# Patient Record
Sex: FEMALE | Race: BLACK OR AFRICAN AMERICAN | NOT HISPANIC OR LATINO | URBAN - METROPOLITAN AREA
[De-identification: names, ages, dates, MRNs, and addresses within clinical notes are randomized per-mention and may not be internally consistent; named-entity substitution may affect disease eponyms.]

---

## 2022-11-08 ENCOUNTER — EMERGENCY (EMERGENCY)
Facility: HOSPITAL | Age: 22
LOS: 1 days | Discharge: HOME | End: 2022-11-08
Attending: EMERGENCY MEDICINE | Admitting: EMERGENCY MEDICINE
Payer: COMMERCIAL

## 2022-11-08 VITALS
OXYGEN SATURATION: 99 % | SYSTOLIC BLOOD PRESSURE: 115 MMHG | TEMPERATURE: 98 F | RESPIRATION RATE: 16 BRPM | HEART RATE: 67 BPM | DIASTOLIC BLOOD PRESSURE: 67 MMHG

## 2022-11-08 VITALS
OXYGEN SATURATION: 100 % | WEIGHT: 161.38 LBS | HEART RATE: 78 BPM | TEMPERATURE: 98 F | RESPIRATION RATE: 16 BRPM | DIASTOLIC BLOOD PRESSURE: 75 MMHG | SYSTOLIC BLOOD PRESSURE: 121 MMHG

## 2022-11-08 DIAGNOSIS — Z87.39 PERSONAL HISTORY OF OTHER DISEASES OF THE MUSCULOSKELETAL SYSTEM AND CONNECTIVE TISSUE: ICD-10-CM

## 2022-11-08 DIAGNOSIS — Z20.822 CONTACT WITH AND (SUSPECTED) EXPOSURE TO COVID-19: ICD-10-CM

## 2022-11-08 DIAGNOSIS — R29.810 FACIAL WEAKNESS: ICD-10-CM

## 2022-11-08 DIAGNOSIS — Z87.19 PERSONAL HISTORY OF OTHER DISEASES OF THE DIGESTIVE SYSTEM: ICD-10-CM

## 2022-11-08 DIAGNOSIS — J00 ACUTE NASOPHARYNGITIS [COMMON COLD]: ICD-10-CM

## 2022-11-08 DIAGNOSIS — R20.0 ANESTHESIA OF SKIN: ICD-10-CM

## 2022-11-08 DIAGNOSIS — Z82.49 FAMILY HISTORY OF ISCHEMIC HEART DISEASE AND OTHER DISEASES OF THE CIRCULATORY SYSTEM: ICD-10-CM

## 2022-11-08 LAB
ALBUMIN SERPL ELPH-MCNC: 4.9 G/DL — SIGNIFICANT CHANGE UP (ref 3.3–5)
ALP SERPL-CCNC: 64 U/L — SIGNIFICANT CHANGE UP (ref 40–120)
ALT FLD-CCNC: 11 U/L — SIGNIFICANT CHANGE UP (ref 10–45)
ANION GAP SERPL CALC-SCNC: 10 MMOL/L — SIGNIFICANT CHANGE UP (ref 5–17)
AST SERPL-CCNC: 14 U/L — SIGNIFICANT CHANGE UP (ref 10–40)
BASOPHILS # BLD AUTO: 0.03 K/UL — SIGNIFICANT CHANGE UP (ref 0–0.2)
BASOPHILS NFR BLD AUTO: 0.3 % — SIGNIFICANT CHANGE UP (ref 0–2)
BILIRUB SERPL-MCNC: 0.5 MG/DL — SIGNIFICANT CHANGE UP (ref 0.2–1.2)
BUN SERPL-MCNC: 9 MG/DL — SIGNIFICANT CHANGE UP (ref 7–23)
CALCIUM SERPL-MCNC: 9.3 MG/DL — SIGNIFICANT CHANGE UP (ref 8.4–10.5)
CHLORIDE SERPL-SCNC: 100 MMOL/L — SIGNIFICANT CHANGE UP (ref 96–108)
CO2 SERPL-SCNC: 26 MMOL/L — SIGNIFICANT CHANGE UP (ref 22–31)
CREAT SERPL-MCNC: 0.98 MG/DL — SIGNIFICANT CHANGE UP (ref 0.5–1.3)
EGFR: 83 ML/MIN/1.73M2 — SIGNIFICANT CHANGE UP
EOSINOPHIL # BLD AUTO: 0.03 K/UL — SIGNIFICANT CHANGE UP (ref 0–0.5)
EOSINOPHIL NFR BLD AUTO: 0.3 % — SIGNIFICANT CHANGE UP (ref 0–6)
GLUCOSE SERPL-MCNC: 100 MG/DL — HIGH (ref 70–99)
HCG UR QL: NEGATIVE — SIGNIFICANT CHANGE UP
HCT VFR BLD CALC: 36.8 % — SIGNIFICANT CHANGE UP (ref 34.5–45)
HGB BLD-MCNC: 11.7 G/DL — SIGNIFICANT CHANGE UP (ref 11.5–15.5)
IMM GRANULOCYTES NFR BLD AUTO: 0.3 % — SIGNIFICANT CHANGE UP (ref 0–0.9)
LYMPHOCYTES # BLD AUTO: 2.34 K/UL — SIGNIFICANT CHANGE UP (ref 1–3.3)
LYMPHOCYTES # BLD AUTO: 24 % — SIGNIFICANT CHANGE UP (ref 13–44)
MCHC RBC-ENTMCNC: 27.9 PG — SIGNIFICANT CHANGE UP (ref 27–34)
MCHC RBC-ENTMCNC: 31.8 GM/DL — LOW (ref 32–36)
MCV RBC AUTO: 87.8 FL — SIGNIFICANT CHANGE UP (ref 80–100)
MONOCYTES # BLD AUTO: 0.5 K/UL — SIGNIFICANT CHANGE UP (ref 0–0.9)
MONOCYTES NFR BLD AUTO: 5.1 % — SIGNIFICANT CHANGE UP (ref 2–14)
NEUTROPHILS # BLD AUTO: 6.84 K/UL — SIGNIFICANT CHANGE UP (ref 1.8–7.4)
NEUTROPHILS NFR BLD AUTO: 70 % — SIGNIFICANT CHANGE UP (ref 43–77)
NRBC # BLD: 0 /100 WBCS — SIGNIFICANT CHANGE UP (ref 0–0)
PLATELET # BLD AUTO: 290 K/UL — SIGNIFICANT CHANGE UP (ref 150–400)
POTASSIUM SERPL-MCNC: 3.8 MMOL/L — SIGNIFICANT CHANGE UP (ref 3.5–5.3)
POTASSIUM SERPL-SCNC: 3.8 MMOL/L — SIGNIFICANT CHANGE UP (ref 3.5–5.3)
PROT SERPL-MCNC: 8.2 G/DL — SIGNIFICANT CHANGE UP (ref 6–8.3)
RBC # BLD: 4.19 M/UL — SIGNIFICANT CHANGE UP (ref 3.8–5.2)
RBC # FLD: 14.3 % — SIGNIFICANT CHANGE UP (ref 10.3–14.5)
SARS-COV-2 RNA SPEC QL NAA+PROBE: NEGATIVE — SIGNIFICANT CHANGE UP
SODIUM SERPL-SCNC: 136 MMOL/L — SIGNIFICANT CHANGE UP (ref 135–145)
WBC # BLD: 9.77 K/UL — SIGNIFICANT CHANGE UP (ref 3.8–10.5)
WBC # FLD AUTO: 9.77 K/UL — SIGNIFICANT CHANGE UP (ref 3.8–10.5)

## 2022-11-08 PROCEDURE — 85025 COMPLETE CBC W/AUTO DIFF WBC: CPT

## 2022-11-08 PROCEDURE — 70498 CT ANGIOGRAPHY NECK: CPT | Mod: MA

## 2022-11-08 PROCEDURE — 0042T: CPT

## 2022-11-08 PROCEDURE — 99285 EMERGENCY DEPT VISIT HI MDM: CPT

## 2022-11-08 PROCEDURE — 70498 CT ANGIOGRAPHY NECK: CPT | Mod: 26,MA

## 2022-11-08 PROCEDURE — 70551 MRI BRAIN STEM W/O DYE: CPT | Mod: 26,ME

## 2022-11-08 PROCEDURE — 82962 GLUCOSE BLOOD TEST: CPT

## 2022-11-08 PROCEDURE — 70496 CT ANGIOGRAPHY HEAD: CPT | Mod: MA

## 2022-11-08 PROCEDURE — 80053 COMPREHEN METABOLIC PANEL: CPT

## 2022-11-08 PROCEDURE — 70551 MRI BRAIN STEM W/O DYE: CPT | Mod: ME

## 2022-11-08 PROCEDURE — G1004: CPT

## 2022-11-08 PROCEDURE — 93005 ELECTROCARDIOGRAM TRACING: CPT

## 2022-11-08 PROCEDURE — 87635 SARS-COV-2 COVID-19 AMP PRB: CPT

## 2022-11-08 PROCEDURE — 36415 COLL VENOUS BLD VENIPUNCTURE: CPT

## 2022-11-08 PROCEDURE — 99285 EMERGENCY DEPT VISIT HI MDM: CPT | Mod: 25

## 2022-11-08 PROCEDURE — 70496 CT ANGIOGRAPHY HEAD: CPT | Mod: 26,MA

## 2022-11-08 PROCEDURE — 81025 URINE PREGNANCY TEST: CPT

## 2022-11-08 PROCEDURE — 70450 CT HEAD/BRAIN W/O DYE: CPT | Mod: MA

## 2022-11-08 RX ADMIN — Medication 60 MILLIGRAM(S): at 17:13

## 2022-11-08 NOTE — ED ADULT NURSE NOTE - OBJECTIVE STATEMENT
pt A&Ox4 am pt A&Ox4 ambulatory appears comfortable arrives via walk in triage for eval left sided facial droop, heaviness, and decreased sensation last normal 11/7/22 at 19:00 friend noticed droop and told pt to go look at it. Thought it would go away but did not. b/l equal hand grasps on exam 5/5 strength to upper and lower extremities. No head ache blurry vision slurred speech noted. Respirations unlabored no cp sob airway patent speaks clear full sentences. abd nondistended no n/v/d constipation fever chills. no le swelling. stroke code activated in triage. NIH scale 2. stroke states unlikely cva more likely bells palsy. CTs done pt pending mri short stroke protocol ED and stroke state ok to go off continuous cardiac monitor.

## 2022-11-08 NOTE — ED ADULT TRIAGE NOTE - CHIEF COMPLAINT QUOTE
Pt c/o facial droop and decreased sensation to left side of face since last night at 1900. Denies headache, dizziness, vision changes.

## 2022-11-08 NOTE — CONSULT NOTE ADULT - SUBJECTIVE AND OBJECTIVE BOX
**STROKE CODE CONSULT NOTE**    Last known well time/Time of onset of symptoms: 11/7/22 @ 1900    HPI: 22y Female with PMHx of acid reflux and ACL repair presents to the ED with left facial droop and decreased sensation of the left side of her face. Her facial droop was noted 11/7 at 1900 by the patient's friend and she feels as though it's gotten progressively worse since then prompting her visit to the ED this morning. She also has increasing loss in sensation of her face from her forehead to her jaw. She feels like she has some weakness to the left eyelid. The patient reports that she has been in her normal state except for a mild cold. She denies loss or change of taste, difficulty hearing, or headache.     T(C): 36.7 (11-08-22 @ 13:00), Max: 36.7 (11-08-22 @ 12:43)  HR: 72 (11-08-22 @ 14:00) (56 - 78)  BP: 118/68 (11-08-22 @ 14:00) (118/68 - 140/67)  RR: 16 (11-08-22 @ 14:00) (16 - 16)  SpO2: 100% (11-08-22 @ 14:00) (100% - 100%)    PAST MEDICAL & SURGICAL HISTORY:  ACL Repair  Acid Reflux    FAMILY HISTORY:      SOCIAL HISTORY:   Patient lives at home.   Smoking status: Occasional  Drinking: Denies  Drug Use: Marijuana use 3x/ week    ROS:   Constitutional: No fever, weight loss or fatigue  Eyes: No eye pain, visual disturbances, or discharge  ENMT:  No difficulty hearing, tinnitus; No sinus or throat pain  Neck: No pain or stiffness  Respiratory: No cough, wheezing, chills or hemoptysis. Endorsed rhinitis.   Cardiovascular: No chest pain, palpitations, shortness of breath, or leg swelling  Gastrointestinal: No abdominal pain. No nausea, vomiting or hematemesis; No diarrhea or constipation. Nohematochezia.  Genitourinary: No dysuria, frequency, hematuria or incontinence  Neurological: As per HPI  Skin: No itching, burning, rashes or lesions   Endocrine: No heat or cold intolerance; No hair loss  Musculoskeletal: No joint pain or swelling; No muscle, back or extremity pain  Heme/Lymph: No easy bruising or bleeding gums    MEDICATIONS  (STANDING):    MEDICATIONS  (PRN):    Allergies    No Known Allergies    Intolerances      Vital Signs Last 24 Hrs  T(C): 36.7 (08 Nov 2022 13:00), Max: 36.7 (08 Nov 2022 12:43)  T(F): 98 (08 Nov 2022 13:00), Max: 98 (08 Nov 2022 12:43)  HR: 72 (08 Nov 2022 14:00) (56 - 78)  BP: 118/68 (08 Nov 2022 14:00) (118/68 - 140/67)  BP(mean): --  RR: 16 (08 Nov 2022 14:00) (16 - 16)  SpO2: 100% (08 Nov 2022 14:00) (100% - 100%)    Parameters below as of 08 Nov 2022 14:00  Patient On (Oxygen Delivery Method): room air        Physical exam:  Constitutional: No acute distress, conversant  Eyes: Anicteric sclerae, moist conjunctivae, see below for CNs  Neck: trachea midline  Cardiovascular: Regular rate and rhythm on monitor   Pulmonary: No use of accessory muscles  GI: Abdomen soft, non-distended, non-tender  Extremities: Radial and DP pulses +2, no edema    Neurologic:  -Mental status: Awake, alert, oriented to person, place, and time. Speech is fluent with intact naming, repetition, and comprehension, no dysarthria. Recent and remote memory intact. Follows commands. Attention/concentration intact. Fund of knowledge appropriate.  -Cranial nerves:   II: Visual fields are full to confrontation.  III, IV, VI: Extraocular movements are intact without nystagmus. Pupils equally round and reactive to light  V:  Facial sensation V1-V3 decreased on left face, most densely in V3  VII: Face is asymmetric with left facial droop  Motor: Normal bulk and tone. No pronator drift. Strength bilateral upper extremity 5/5, bilateral lower extremities 5/5.  Rapid alternating movements intact and symmetric  Sensation: Intact to light touch bilaterally. No neglect or extinction on double simultaneous testing.  Coordination: No dysmetria on finger-to-nose bilaterally  Gait: Narrow gait and steady    NIHSS: 2     Fingerstick Blood Glucose: CAPILLARY BLOOD GLUCOSE  92 (08 Nov 2022 14:38)      POCT Blood Glucose.: 92 mg/dL (08 Nov 2022 12:43)    LABS:                        11.7   9.77  )-----------( 290      ( 08 Nov 2022 13:17 )             36.8     11-08    136  |  100  |  9   ----------------------------<  100<H>  3.8   |  26  |  0.98    Ca    9.3      08 Nov 2022 13:17    TPro  8.2  /  Alb  4.9  /  TBili  0.5  /  DBili  x   /  AST  14  /  ALT  11  /  AlkPhos  64  11-08              RADIOLOGY & ADDITIONAL STUDIES:    < from: CT Brain Stroke Protocol (11.08.22 @ 13:09) >  Impression:    No acute intracranial hemorrhage, mass effect or large demarcated   territorial infarction..    < end of copied text >    -----------------------------------------------------------------------------------------------------------------  IV-tPA (Y/N):    N                              Bolus time:    Alteplase Dose Verification w/ RN:  Reason IV-tPA not given: Patient out of window for TPA    **STROKE CODE CONSULT NOTE**    Last known well time/Time of onset of symptoms: 11/7/22 @ 1900    HPI: 22y Female with PMHx of acid reflux and ACL repair presents to the ED with left facial droop and decreased sensation of the left side of her face. Her facial droop was noted 11/7 at 1900 by the patient's friend and she feels as though it's gotten progressively worse since then prompting her visit to the ED this morning. She also has increasing loss in sensation of her face from her forehead to her jaw. She feels like she has some weakness to the left eyelid. The patient reports that she has been in her normal state of health except for a mild cold. She denies loss or change of taste,  headache, weakness in arms/legs, speech changes.     T(C): 36.7 (11-08-22 @ 13:00), Max: 36.7 (11-08-22 @ 12:43)  HR: 72 (11-08-22 @ 14:00) (56 - 78)  BP: 118/68 (11-08-22 @ 14:00) (118/68 - 140/67)  RR: 16 (11-08-22 @ 14:00) (16 - 16)  SpO2: 100% (11-08-22 @ 14:00) (100% - 100%)    PAST MEDICAL & SURGICAL HISTORY:  ACL Repair  Acid Reflux    FAMILY HISTORY:      SOCIAL HISTORY:   Patient lives at home.   Smoking status: Occasional  Drinking: Denies  Drug Use: Marijuana use 3x/ week    ROS:   Constitutional: No fever, weight loss or fatigue  Eyes: No eye pain, visual disturbances, or discharge  ENMT:  No difficulty hearing, tinnitus; No sinus or throat pain  Neck: No pain or stiffness  Respiratory: No cough, wheezing, chills or hemoptysis. Endorsed rhinitis.   Cardiovascular: No chest pain, palpitations, shortness of breath, or leg swelling  Gastrointestinal: No abdominal pain. No nausea, vomiting or hematemesis; No diarrhea or constipation. Nohematochezia.  Genitourinary: No dysuria, frequency, hematuria or incontinence  Neurological: As per HPI  Skin: No itching, burning, rashes or lesions   Endocrine: No heat or cold intolerance; No hair loss  Musculoskeletal: No joint pain or swelling; No muscle, back or extremity pain  Heme/Lymph: No easy bruising or bleeding gums    MEDICATIONS  (STANDING):    MEDICATIONS  (PRN):    Allergies    No Known Allergies    Intolerances      Vital Signs Last 24 Hrs  T(C): 36.7 (08 Nov 2022 13:00), Max: 36.7 (08 Nov 2022 12:43)  T(F): 98 (08 Nov 2022 13:00), Max: 98 (08 Nov 2022 12:43)  HR: 72 (08 Nov 2022 14:00) (56 - 78)  BP: 118/68 (08 Nov 2022 14:00) (118/68 - 140/67)  BP(mean): --  RR: 16 (08 Nov 2022 14:00) (16 - 16)  SpO2: 100% (08 Nov 2022 14:00) (100% - 100%)    Parameters below as of 08 Nov 2022 14:00  Patient On (Oxygen Delivery Method): room air        Physical exam:  Constitutional: No acute distress, conversant  Eyes: Anicteric sclerae, moist conjunctivae, see below for CNs  Neck: trachea midline  Cardiovascular: Regular rate and rhythm on monitor   Pulmonary: No use of accessory muscles  GI: Abdomen soft, non-distended, non-tender  Extremities: Radial and DP pulses +2, no edema    Neurologic:  -Mental status: Awake, alert, oriented to person, place, and time. Speech is fluent with intact naming, repetition, and comprehension, no dysarthria. Recent and remote memory intact. Follows commands. Attention/concentration intact. Fund of knowledge appropriate.  -Cranial nerves:   II: Visual fields are full to confrontation.  III, IV, VI: Extraocular movements are intact without nystagmus. Pupils equally round and reactive to light  V:  Facial sensation V1-V3 decreased on left face, most densely in V3  VII: Face is asymmetric with left facial droop  Motor: Normal bulk and tone. No pronator drift. Strength bilateral upper extremity 5/5, bilateral lower extremities 5/5.  Rapid alternating movements intact and symmetric  Sensation: Intact to light touch bilaterally. No neglect or extinction on double simultaneous testing.  Coordination: No dysmetria on finger-to-nose bilaterally  Gait: Narrow gait and steady    NIHSS: 2     Fingerstick Blood Glucose: CAPILLARY BLOOD GLUCOSE  92 (08 Nov 2022 14:38)      POCT Blood Glucose.: 92 mg/dL (08 Nov 2022 12:43)    LABS:                        11.7   9.77  )-----------( 290      ( 08 Nov 2022 13:17 )             36.8     11-08    136  |  100  |  9   ----------------------------<  100<H>  3.8   |  26  |  0.98    Ca    9.3      08 Nov 2022 13:17    TPro  8.2  /  Alb  4.9  /  TBili  0.5  /  DBili  x   /  AST  14  /  ALT  11  /  AlkPhos  64  11-08              RADIOLOGY & ADDITIONAL STUDIES:    < from: CT Brain Stroke Protocol (11.08.22 @ 13:09) >  Impression:    No acute intracranial hemorrhage, mass effect or large demarcated   territorial infarction..    < end of copied text >    -----------------------------------------------------------------------------------------------------------------  IV-tPA (Y/N):    N                              Bolus time:    Alteplase Dose Verification w/ RN:  Reason IV-tPA not given: Patient out of window for TPA    **STROKE CODE CONSULT NOTE**    Last known well time/Time of onset of symptoms: 11/7/22 @ 1900    HPI: 22y Female with PMHx of acid reflux and ACL repair presents to the ED with left facial droop and decreased sensation of the left side of her face. Her facial droop was noted 11/7 at 1900 by the patient's friend and she feels as though it's gotten progressively worse since then prompting her visit to the ED this morning. She also has increasing loss in sensation of her face from her forehead to her jaw. She feels like she has some weakness to the left eyelid. Initially on exam the patient has a left facial droop, throughout interview with patient she intermittently smiles symmetrically. The patient reports that she has been in her normal state of health except for a mild cold. She denies loss or change of taste,  headache, weakness in arms/legs, speech changes.     T(C): 36.7 (11-08-22 @ 13:00), Max: 36.7 (11-08-22 @ 12:43)  HR: 72 (11-08-22 @ 14:00) (56 - 78)  BP: 118/68 (11-08-22 @ 14:00) (118/68 - 140/67)  RR: 16 (11-08-22 @ 14:00) (16 - 16)  SpO2: 100% (11-08-22 @ 14:00) (100% - 100%)    PAST MEDICAL & SURGICAL HISTORY:  ACL Repair  Acid Reflux    FAMILY HISTORY:      SOCIAL HISTORY:   Patient lives at home.   Smoking status: Occasional  Drinking: Denies  Drug Use: Marijuana use 3x/ week    ROS:   Constitutional: No fever, weight loss or fatigue  Eyes: No eye pain, visual disturbances, or discharge  ENMT:  No difficulty hearing, tinnitus; No sinus or throat pain  Neck: No pain or stiffness  Respiratory: No cough, wheezing, chills or hemoptysis. Endorsed rhinitis.   Cardiovascular: No chest pain, palpitations, shortness of breath, or leg swelling  Gastrointestinal: No abdominal pain. No nausea, vomiting or hematemesis; No diarrhea or constipation. Nohematochezia.  Genitourinary: No dysuria, frequency, hematuria or incontinence  Neurological: As per HPI  Skin: No itching, burning, rashes or lesions   Endocrine: No heat or cold intolerance; No hair loss  Musculoskeletal: No joint pain or swelling; No muscle, back or extremity pain  Heme/Lymph: No easy bruising or bleeding gums    MEDICATIONS  (STANDING):    MEDICATIONS  (PRN):    Allergies    No Known Allergies    Intolerances      Vital Signs Last 24 Hrs  T(C): 36.7 (08 Nov 2022 13:00), Max: 36.7 (08 Nov 2022 12:43)  T(F): 98 (08 Nov 2022 13:00), Max: 98 (08 Nov 2022 12:43)  HR: 72 (08 Nov 2022 14:00) (56 - 78)  BP: 118/68 (08 Nov 2022 14:00) (118/68 - 140/67)  BP(mean): --  RR: 16 (08 Nov 2022 14:00) (16 - 16)  SpO2: 100% (08 Nov 2022 14:00) (100% - 100%)    Parameters below as of 08 Nov 2022 14:00  Patient On (Oxygen Delivery Method): room air        Physical exam:  Constitutional: No acute distress, conversant  Eyes: Anicteric sclerae, moist conjunctivae, see below for CNs  Neck: trachea midline  Cardiovascular: Regular rate and rhythm on monitor   Pulmonary: No use of accessory muscles  GI: Abdomen soft, non-distended, non-tender  Extremities: Radial and DP pulses +2, no edema    Neurologic:  -Mental status: Awake, alert, oriented to person, place, and time. Speech is fluent with intact naming, repetition, and comprehension, no dysarthria. Recent and remote memory intact. Follows commands. Attention/concentration intact. Fund of knowledge appropriate.  -Cranial nerves:   II: Visual fields are full to confrontation.  III, IV, VI: Extraocular movements are intact without nystagmus. Pupils equally round and reactive to light  V:  Facial sensation V1-V3 decreased on left face, most densely in V3  VII: Face is asymmetric with left facial droop. Consistent when repeatedly asked to smile. Patient smiles and laughs throughout conversation and interview, appears symmetric. Able to raise both eyebrows although forehead on left appears slightly less furrowed. Slightly easier to open left eyelid when closing eyes tightly.  Motor: Normal bulk and tone. No pronator drift. Strength bilateral upper extremity 5/5, bilateral lower extremities 5/5.  Rapid alternating movements intact and symmetric  Sensation: Intact to light touch bilaterally. No neglect or extinction on double simultaneous testing.  Coordination: No dysmetria on finger-to-nose bilaterally  Gait: Narrow gait and steady    NIHSS: 2     Fingerstick Blood Glucose: CAPILLARY BLOOD GLUCOSE  92 (08 Nov 2022 14:38)      POCT Blood Glucose.: 92 mg/dL (08 Nov 2022 12:43)    LABS:                        11.7   9.77  )-----------( 290      ( 08 Nov 2022 13:17 )             36.8     11-08    136  |  100  |  9   ----------------------------<  100<H>  3.8   |  26  |  0.98    Ca    9.3      08 Nov 2022 13:17    TPro  8.2  /  Alb  4.9  /  TBili  0.5  /  DBili  x   /  AST  14  /  ALT  11  /  AlkPhos  64  11-08              RADIOLOGY & ADDITIONAL STUDIES:    < from: CT Brain Stroke Protocol (11.08.22 @ 13:09) >  Impression:    No acute intracranial hemorrhage, mass effect or large demarcated   territorial infarction..    < end of copied text >    -----------------------------------------------------------------------------------------------------------------  IV-tPA (Y/N):    N                              Bolus time:    Alteplase Dose Verification w/ RN:  Reason IV-tPA not given: Patient out of window for TPA

## 2022-11-08 NOTE — CONSULT NOTE ADULT - ASSESSMENT
This is a 22 year old female with past medical history of acid reflux and ACL repair presents to the ED with a left facial droop and decreased sensation to the left face, most densely in the V3 distribution, and subjective weakness in the left eyelid. Symptoms were noted by a friend at 1900 on 11/7 and worsened today prompting this ED visit. Stroke code was called in the ED patient had a CTH that was negative. CTA and perfusion negative for perfusion deficit or LVO. Patient out of the window for TPA. Short stroke protocol MRI completed while patient was in the ED negative for infarct however due to patient's young age with symptoms involving both motor and sensation concern for peripheral nerve involvement (ie entrapment). Will follow up for repeat assessment with attending.     Discussed case with Dr. Downs   This is a 22 year old female with past medical history of acid reflux and ACL repair presents to the ED with a left facial droop and decreased sensation to the left face, most densely in the V3 distribution, and subjective weakness in the left eyelid. Symptoms were noted by a friend at 1900 on 11/7 and worsened today prompting this ED visit. Stroke code was called in the ED patient had a CTH that was negative. CTA and perfusion negative for perfusion deficit or LVO. Patient out of the window for TPA. Short stroke protocol MRI completed while patient was in the ED negative for infarct however due to patient's young age with symptoms involving both motor and sensation concern for peripheral nerve involvement. On further assessment patient with possible Bell's Palsy with some possible functional overlay. Patient discussed with Dr. Downs and plan for dispo per the ED. Consider treating for Bell's Palsy and following up with general neurology if symptoms are persistent or worsen for further workup.     Discussed case with Dr. Downs   This is a 22 year old female with past medical history of acid reflux and ACL repair presents to the ED with a left facial droop and decreased sensation to the left face, most densely in the V3 distribution, and subjective weakness in the left eyelid. Symptoms were noted by a friend at 1900 on 11/7 and worsened today prompting this ED visit. Stroke code was called in the ED patient had a CTH that was negative. CTA and perfusion negative for perfusion deficit or LVO. Patient out of the window for TPA. Short stroke protocol MRI completed while patient was in the ED negative for infarct however due to patient's young age with symptoms involving both motor and sensation concern for peripheral nerve involvement.   On further assessment patient with possible Bell's Palsy with some possible functional overlay (d/t symmetric smile throughout conversation). Patient discussed with Dr. Downs and plan for dispo per the ED.     -Consider treating for Bell's Palsy with steroids and following up with general neurology if symptoms are persistent or worsen for further workup.     Discussed case with Dr. Downs

## 2022-11-08 NOTE — ED ADULT NURSE REASSESSMENT NOTE - NS ED NURSE REASSESS COMMENT FT1
per stroke pt to get mri short stroke protocol MD Zhang and Stroke state pt may go off monitor to mri

## 2022-11-08 NOTE — ED PROVIDER NOTE - CLINICAL SUMMARY MEDICAL DECISION MAKING FREE TEXT BOX
L sided facial droop- seen by stroke team in CT- request- CT/CTa's- will follow L sided facial droop- seen by stroke team in CT- request- CT/CTa's- will follow - ct/mri all neg- d/w attg Dr Downs- not a stroke- probable [place]- steroids rx- si/sx to return d/w px

## 2022-11-08 NOTE — CONSULT NOTE ADULT - NS ATTEND AMEND GEN_ALL_CORE FT
Suspect symptoms of facial weakness could be from early Bell's Palsy but this would not explain subjective numbness to left face/arm. There appears to be a potential element of functional overlay. Regardless, rec tx for Bell's Palsy. MRI negative for acute stroke. Patient can f/u outpatient but should return if symptoms worse for reevaluation/consideration of repeat MRI w and w/o contrast

## 2022-11-08 NOTE — ED PROVIDER NOTE - NSFOLLOWUPINSTRUCTIONS_ED_ALL_ED_FT
Mclaughlin's Palsy, Adult       Bell's palsy is a short-term inability to move muscles in a part of the face. The inability to move, also called paralysis, results from inflammation or compression of the seventh cranial nerve. This nerve travels along the skull and under the ear to the side of the face. This nerve is responsible for facial movements that include blinking, closing the eyes, smiling, and frowning.      What are the causes?    The exact cause of this condition is not known. It may be caused by an infection from a virus, such as the chickenpox (herpes zoster), Elvi–Barr, or mumps virus.      What increases the risk?    You are more likely to develop this condition if:  •You are pregnant.      •You have diabetes.      •You have had a recent infection in your nose, throat, or airways.      •You have a weakened body defense system (immune system).      •You have had a facial injury, such as a fracture.      •You have a family history of Bell's palsy.        What are the signs or symptoms?    Symptoms of this condition include:  •Weakness on one side of the face.      •Drooping eyelid and corner of the mouth.      •Excessive tearing in one eye.      •Difficulty closing the eyelid.      •Dry eye.      •Drooling.      •Dry mouth.      •Changes in taste.      •Change in facial appearance.      •Pain behind one ear.      •Ringing in one or both ears.      •Sensitivity to sound in one ear.      •Facial twitching.      •Headache.      •Impaired speech.      •Dizziness.      •Difficulty eating or drinking.      Most of the time, only one side of the face is affected. In rare cases, Bell's palsy may affect the whole face.      How is this diagnosed?    This condition is diagnosed based on:  •Your symptoms.      •Your medical history.      •A physical exam.      You may also have to see health care providers who specialize in disorders of the nerves (neurologist) or diseases and conditions of the eye (ophthalmologist). You may have tests, such as:  •A test to check for nerve damage (electromyogram).      •Imaging studies, such as a CT scan or an MRI.      •Blood tests.        How is this treated?    This condition affects every person differently. Sometimes symptoms go away without treatment within a couple weeks. If treatment is needed, it varies from person to person. The goal of treatment is to reduce inflammation and protect the eye from damage. Treatment for Bell's palsy may include:•Medicines, such as:  •Steroids to reduce swelling and inflammation.      •Antiviral medicines.      •Pain relievers, including aspirin, acetaminophen, or ibuprofen.        •Eye drops or ointment to keep your eye moist.      •Eye protection, if you cannot close your eye.      •Exercises or massage to regain muscle strength and function (physical therapy).        Follow these instructions at home:     •Take over-the-counter and prescription medicines only as told by your health care provider.    •If your eye is affected:  •Keep your eye moist with eye drops or ointment as told by your health care provider.      •Follow instructions for eye care and protection as told by your health care provider.        •Do any physical therapy exercises as told by your health care provider.      •Keep all follow-up visits. This is important.        Contact a health care provider if:    •You have a fever or chills.      •Your symptoms do not get better within 2–3 weeks, or your symptoms get worse.      •Your eye is red, irritated, or painful.      •You have new symptoms.        Get help right away if:    •You have weakness or numbness in a part of your body other than your face.      •You have trouble swallowing.      •You develop neck pain or stiffness.      •You develop dizziness or shortness of breath.        Summary    •Bell's palsy is a short-term inability to move muscles in a part of the face. The inability to move results from inflammation or compression of the facial nerve.      •This condition affects every person differently. Sometimes symptoms go away without treatment within a couple weeks.      •If treatment is needed, it varies from person to person. The goal of treatment is to reduce inflammation and protect the eye from damage.      •Contact your health care provider if your symptoms do not get better within 2–3 weeks, or your symptoms get worse.      This information is not intended to replace advice given to you by your health care provider. Make sure you discuss any questions you have with your health care provider.      Document Revised: 09/16/2021 Document Reviewed: 09/16/2021    [employer]vier Patient Education © 2022 [employer]

## 2022-11-08 NOTE — ED PROVIDER NOTE - PATIENT PORTAL LINK FT
You can access the FollowMyHealth Patient Portal offered by [facility] by registering at the following website: [email or web address] By joining [email or web address]’s FollowMyHealth portal, you will also be able to view your health information using other applications (apps) compatible with our system.

## 2022-11-08 NOTE — ED PROVIDER NOTE - OBJECTIVE STATEMENT
22 F co L sided facial droop- a friend told her the L side of her mouth was drooping last night- first noted around 7 pm- co mild numbness to L side of face  no abd taste- mild weakness to L eyelid muscles- no ha no n/v  no sig pmh  fh- aneurysm  THC yesterday- denies other drugs/denies etoh  mod severity